# Patient Record
(demographics unavailable — no encounter records)

---

## 2024-11-11 NOTE — HISTORY OF PRESENT ILLNESS
[0] : 0 [Localized] : localized [Intermittent] : intermittent [Work] : work [Ice] : ice [Full time] : Work status: full time [de-identified] : 11/11/2024: Pt here with L Knee pain since 11/4/24 with clicking and popping noises. 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 [] : no [FreeTextEntry1] : l knee [FreeTextEntry5] : knee cap for a week, no injury [FreeTextEntry6] : feels pressure  [FreeTextEntry9] : elevation [de-identified] : over use, certain motions, like pivot and turning, getting a full bend [de-identified] : construction

## 2024-11-11 NOTE — ASSESSMENT
[FreeTextEntry1] : 11/11/2024: L Knee x-rays, 4 views, reveal negative Mechanical sx with a painful clicking in the knee. Underlying pathology reviewed and treatment options discussed. Obtain MRI L knee R/O MMT. Start PT and HEP to improve mechanics and reduce pain. Activity modification as tolerated. Prescribed mobic. Questions addressed. Follow up after MRI.   The documentation recorded by the scribe accurately reflects the service I personally performed and the decisions made by me. MADDIE, Nancy Carlson, attest that this documentation has been prepared under the direction and in the presence of Provider Moises Banks MD.   The patient was seen by Moises Banks MD.

## 2024-11-11 NOTE — PHYSICAL EXAM
[Left] : left knee [] : medial joint line tenderness [NL (140)] : flexion 140 degrees [NL (0)] : extension 0 degrees